# Patient Record
Sex: FEMALE | Race: WHITE
[De-identification: names, ages, dates, MRNs, and addresses within clinical notes are randomized per-mention and may not be internally consistent; named-entity substitution may affect disease eponyms.]

---

## 2017-03-13 NOTE — RAD
Indication: Abdominal pain.



Technique: Axial images and coronal and sagittal reformatted images are

provided. Patient received oral contrast and 75 mL of intravenous Omnipaque

300 without complication. No comparison is available.



One or more of the following individualized dose reduction techniques were

utilized for this examination:

1. Automated exposure control

2. Adjustment of the mA and/or kV according to patient size

3. Use of iterative reconstruction technique



Findings: The lung bases are clear. There is no pleural effusion. The heart is

not enlarged.



There is fatty infiltration of the liver. Gallbladder is surgically absent.

Spleen is not enlarged. The pancreas and the adrenals are unremarkable. The

kidneys are symmetrically perfused. Aorta is normal caliber. There is no small

bowel obstruction or mural thickening. Colon is grossly unremarkable. Normal

appendix is noted. Small fat-containing umbilical hernia is noted. Small

widemouthed fat containing right paramidline ventral hernia is noted in the

upper abdomen.



There is no free pelvic fluid. Bladder is unremarkable. There is no adnexal

mass. Bony structures are intact.



Impression:

1. No acute abdominal findings.

2. Fatty infiltration of the liver.

## 2017-06-02 NOTE — RAD
EXAM: DIGITAL SCREEN BILAT W/CAD



HISTORY: Screening.



COMPARISON: 3/26/2015 and 4/8/2016.



This study was interpreted with the benefit of Computerized Aided Detection

(CAD).





The breast parenchyma is primarily fatty replaced. Breast parenchyma level

density A.







FINDINGS:



No dominant suspicious mass, suspicious microcalcifications, or architectural

distortion is identified.  







IMPRESSION: 

No evidence of malignancy.







BI-RADS CATEGORY: 1 NEGATIVE



RECOMMENDED FOLLOW-UP: 12M 12 MONTH FOLLOW-UP



PQRS compliance statement: Patient information was entered into a reminder

system with a target due date     for the next mammogram.



Mammography is a sensitive method for finding small breast cancers, but it

does not detect them all and is not a substitute for careful clinical

examination.  A negative mammogram does not negate a clinically suspicious

finding and should not result in delay in biopsying a clinically suspicious

abnormality.



"Our facility is accredited by the [employer] Mammography

Program."

## 2017-10-05 ENCOUNTER — HOSPITAL ENCOUNTER (EMERGENCY)
Dept: HOSPITAL 61 - ER | Age: 42
Discharge: HOME | End: 2017-10-05
Payer: COMMERCIAL

## 2017-10-05 VITALS — BODY MASS INDEX: 48.29 KG/M2 | WEIGHT: 246 LBS | HEIGHT: 60 IN

## 2017-10-05 VITALS — SYSTOLIC BLOOD PRESSURE: 148 MMHG | DIASTOLIC BLOOD PRESSURE: 67 MMHG

## 2017-10-05 DIAGNOSIS — Z86.73: ICD-10-CM

## 2017-10-05 DIAGNOSIS — Z88.8: ICD-10-CM

## 2017-10-05 DIAGNOSIS — K21.9: ICD-10-CM

## 2017-10-05 DIAGNOSIS — Z88.0: ICD-10-CM

## 2017-10-05 DIAGNOSIS — R11.2: ICD-10-CM

## 2017-10-05 DIAGNOSIS — R51: Primary | ICD-10-CM

## 2017-10-05 LAB
BASOPHILS # BLD AUTO: 0 X10^3/UL (ref 0–0.2)
BASOPHILS NFR BLD: 1 % (ref 0–3)
CK SERPL-CCNC: 62 U/L (ref 26–192)
EOSINOPHIL NFR BLD: 2 % (ref 0–3)
ERYTHROCYTE [DISTWIDTH] IN BLOOD BY AUTOMATED COUNT: 13.7 % (ref 11.5–14.5)
HCT VFR BLD CALC: 46.3 % (ref 36–47)
HGB BLD-MCNC: 15.6 G/DL (ref 12–15.5)
LYMPHOCYTES # BLD: 1.6 X10^3/UL (ref 1–4.8)
LYMPHOCYTES NFR BLD AUTO: 18 % (ref 24–48)
MAGNESIUM SERPL-MCNC: 1.8 MG/DL (ref 1.8–2.4)
MCH RBC QN AUTO: 30 PG (ref 25–35)
MCHC RBC AUTO-ENTMCNC: 34 G/DL (ref 31–37)
MCV RBC AUTO: 90 FL (ref 79–100)
MONOCYTES NFR BLD: 6 % (ref 0–9)
NEUTROPHILS NFR BLD AUTO: 74 % (ref 31–73)
PLATELET # BLD AUTO: 211 X10^3/UL (ref 140–400)
RBC # BLD AUTO: 5.16 X10^6/UL (ref 3.5–5.4)
WBC # BLD AUTO: 9 X10^3/UL (ref 4–11)

## 2017-10-05 PROCEDURE — 36415 COLL VENOUS BLD VENIPUNCTURE: CPT

## 2017-10-05 PROCEDURE — 83690 ASSAY OF LIPASE: CPT

## 2017-10-05 PROCEDURE — 85025 COMPLETE CBC W/AUTO DIFF WBC: CPT

## 2017-10-05 PROCEDURE — 99284 EMERGENCY DEPT VISIT MOD MDM: CPT

## 2017-10-05 PROCEDURE — 96361 HYDRATE IV INFUSION ADD-ON: CPT

## 2017-10-05 PROCEDURE — 82550 ASSAY OF CK (CPK): CPT

## 2017-10-05 PROCEDURE — 81025 URINE PREGNANCY TEST: CPT

## 2017-10-05 PROCEDURE — 83735 ASSAY OF MAGNESIUM: CPT

## 2017-10-05 PROCEDURE — 96375 TX/PRO/DX INJ NEW DRUG ADDON: CPT

## 2017-10-05 PROCEDURE — 96374 THER/PROPH/DIAG INJ IV PUSH: CPT

## 2017-10-05 NOTE — PHYS DOC
Past Medical History


Past Medical History:  Anxiety, Bipolar, GERD, TIA


Past Surgical History:  Cholecystectomy, Tonsillectomy, Other


Additional Past Surgical Histo:  adenoidectomy


Alcohol Use:  Occasionally


Drug Use:  None





Adult General


Chief Complaint


Chief Complaint:  HEADACHE





HPI


HPI





Patient is a 41  year old female presenting to the emergency department for 

evaluation of a headache that has been going on since yesterday that she says 

is the same as prior migraine headaches.  He has nausea and vomiting with it 

and cannot hold down her medications including her migraine medications as she 

takes a triptan.  Patient says that she has been constipated since starting a 

new medication several days ago.  She denies any fevers chills diarrhea dysuria 

hematuria vision changes unilateral weakness numbness or tingling.





Review of Systems


Review of Systems





Constitutional: Denies fever or chills []


Eyes: Denies change in visual acuity, redness, or eye pain []


HENT: Denies nasal congestion or sore throat []


Respiratory: Denies cough or shortness of breath []


Cardiovascular: No additional information not addressed in HPI []


GI: Denies abdominal pain, nausea, vomiting, bloody stools or diarrhea []


: Denies dysuria or hematuria []


Musculoskeletal: Denies back pain or joint pain []


Integument: Denies rash or skin lesions []


Neurologic: + headache.  No focal weakness or sensory changes []





Current Medications


Current Medications





Current Medications








 Medications


  (Trade)  Dose


 Ordered  Sig/Milan  Start Time


 Stop Time Status Last Admin


Dose Admin


 


 Diphenhydramine


 HCl


  (Benadryl)  50 mg  1X  ONCE  10/5/17 15:15


 10/5/17 15:16 DC 10/5/17 15:24


50 MG


 


 Fentanyl Citrate


  (Fentanyl 2ml


 Vial)  50 mcg  1X  ONCE  10/5/17 15:15


 10/5/17 15:16 DC 10/5/17 15:23


50 MCG


 


 Ketorolac


 Tromethamine


  (Toradol)  30 mg  1X  ONCE  10/5/17 15:15


 10/5/17 15:16 DC 10/5/17 15:24


30 MG


 


 Metoclopramide HCl


  (Reglan)  10 mg  1X  ONCE  10/5/17 15:15


 10/5/17 15:16 DC 10/5/17 15:24


10 MG


 


 Sodium Chloride  1,000 ml @ 


 1,000 mls/hr  1X  ONCE  10/5/17 15:15


 10/5/17 16:14 DC 10/5/17 15:25


1,000 MLS/HR











Allergies


Allergies





Allergies








Coded Allergies Type Severity Reaction Last Updated Verified


 


  Penicillins Allergy Severe Anaphylaxis 11/13/15 Yes


 


  prochlorperazine Allergy Severe tongue swelling 5/11/14 Yes


 


  prochlorperazine edisylate Allergy Severe Anaphylaxis 11/13/14 Yes


 


  prochlorperazine maleate Allergy Severe Anaphylaxis 11/13/14 Yes











Physical Exam


Physical Exam





Constitutional: Well developed, well nourished, no acute distress, non-toxic 

appearance. []


HENT: Normocephalic, atraumatic, bilateral external ears normal, oropharynx 

moist, no oral exudates, nose normal. []


Eyes: PERRLA, EOMI, conjunctiva normal, no discharge. [] 


Neck: Normal range of motion, no tenderness, supple, no stridor. [] 


Cardiovascular:Heart rate regular rhythm, no murmur []


Lungs & Thorax:  Bilateral breath sounds clear to auscultation []


Abdomen: Bowel sounds normal, soft, no tenderness, no masses, no pulsatile 

masses. [] 


Skin: Warm, dry, no erythema, no rash. [] 


Back: No tenderness, no CVA tenderness. [] 


Extremities: No tenderness, no cyanosis, no clubbing, ROM intact, no edema. [] 


Neurologic: Alert and oriented X 3, normal motor function, normal sensory 

function, no focal deficits noted. []





Current Patient Data


Vital Signs





 Vital Signs








  Date Time  Temp Pulse Resp B/P (MAP) Pulse Ox O2 Delivery O2 Flow Rate FiO2


 


10/5/17 15:57     95 Room Air  


 


10/5/17 15:57  67 14     


 


10/5/17 14:54 98.0   150/74 (99)    





 98.0       








Lab Values





 Laboratory Tests








Test


  10/5/17


14:58 10/5/17


15:10


 


POC Urine HCG, Qualitative


  Hcg negative


(Negative) 


 


 


White Blood Count


  


  9.0 x10^3/uL


(4.0-11.0)


 


Red Blood Count


  


  5.16 x10^6/uL


(3.50-5.40)


 


Hemoglobin


  


  15.6 g/dL


(12.0-15.5)  H


 


Hematocrit


  


  46.3 %


(36.0-47.0)


 


Mean Corpuscular Volume


  


  90 fL ()


 


 


Mean Corpuscular Hemoglobin  30 pg (25-35)  


 


Mean Corpuscular Hemoglobin


Concent 


  34 g/dL


(31-37)


 


Red Cell Distribution Width


  


  13.7 %


(11.5-14.5)


 


Platelet Count


  


  211 x10^3/uL


(140-400)


 


Neutrophils (%) (Auto)  74 % (31-73)  H


 


Lymphocytes (%) (Auto)  18 % (24-48)  L


 


Monocytes (%) (Auto)  6 % (0-9)  


 


Eosinophils (%) (Auto)  2 % (0-3)  


 


Basophils (%) (Auto)  1 % (0-3)  


 


Neutrophils # (Auto)


  


  6.7 x10^3uL


(1.8-7.7)


 


Lymphocytes # (Auto)


  


  1.6 x10^3/uL


(1.0-4.8)


 


Monocytes # (Auto)


  


  0.5 x10^3/uL


(0.0-1.1)


 


Eosinophils # (Auto)


  


  0.2 x10^3/uL


(0.0-0.7)


 


Basophils # (Auto)


  


  0.0 x10^3/uL


(0.0-0.2)


 


Magnesium Level


  


  1.8 mg/dL


(1.8-2.4)


 


Creatine Kinase


  


  62 U/L


()


 


Lipase


  


  105 U/L


()





 Laboratory Tests


10/5/17 15:10











EKG


EKG


[]





Radiology/Procedures


Radiology/Procedures


[]





Course & Med Decision Making


Course & Med Decision Making


Patient with migraine headache that was treated with Toradol Benadryl and 

Reglan and now her headache and nausea is resolved but she is complaining 

mostly of constipation at this point and she sat down on the stool and was 

unable to go.  Patient has repeat normal neurologic exam and is stable for 

discharge so we will give her a glycerin suppository and magnesium citrate and 

have her drink at home have her follow with her primary care provider in the 

next 2-3 days and come back to the ED sooner with worsening pain fevers 

vomiting or other general concerns.  Patient aware and agreeable with plan for 

discharge and verbalized understanding of the above instructions.





Dragon Disclaimer


Dragon Disclaimer


This electronic medical record was generated, in whole or in part, using a 

voice recognition dictation system.





Departure


Departure


Impression:  


 Primary Impression:  


 Headache


 Additional Impression:  


 Constipation


Disposition:  01 HOME, SELF-CARE


Condition:  STABLE


Referrals:  


ELIEZER MG MD (PCP)


Patient Instructions:  Constipation, Adult


Scripts


Ondansetron (ZOFRAN ODT) 4 Mg Tab.rapdis


4 MG PO BID Y for NAUSEA/VOMITING, #10 TAB


   Prov: ELISSA NORTON DO         10/5/17





Problem Qualifiers








 Primary Impression:  


 Headache


 Headache type:  unspecified  Headache chronicity pattern:  unspecified pattern

  Intractability:  not intractable  Qualified Codes:  R51 - Headache








ELISSA NORTON DO Oct 5, 2017 14:57

## 2017-11-29 ENCOUNTER — HOSPITAL ENCOUNTER (EMERGENCY)
Dept: HOSPITAL 61 - ER | Age: 42
Discharge: HOME | End: 2017-11-29
Payer: COMMERCIAL

## 2017-11-29 VITALS
DIASTOLIC BLOOD PRESSURE: 64 MMHG | SYSTOLIC BLOOD PRESSURE: 128 MMHG | SYSTOLIC BLOOD PRESSURE: 128 MMHG | DIASTOLIC BLOOD PRESSURE: 64 MMHG

## 2017-11-29 VITALS — WEIGHT: 230 LBS | BODY MASS INDEX: 45.16 KG/M2 | HEIGHT: 60 IN

## 2017-11-29 DIAGNOSIS — G43.909: Primary | ICD-10-CM

## 2017-11-29 DIAGNOSIS — I10: ICD-10-CM

## 2017-11-29 DIAGNOSIS — K21.9: ICD-10-CM

## 2017-11-29 DIAGNOSIS — Z88.8: ICD-10-CM

## 2017-11-29 DIAGNOSIS — F31.9: ICD-10-CM

## 2017-11-29 DIAGNOSIS — Z86.73: ICD-10-CM

## 2017-11-29 DIAGNOSIS — F41.9: ICD-10-CM

## 2017-11-29 DIAGNOSIS — Z90.49: ICD-10-CM

## 2017-11-29 DIAGNOSIS — Z88.0: ICD-10-CM

## 2017-11-29 PROCEDURE — 81025 URINE PREGNANCY TEST: CPT

## 2017-11-29 PROCEDURE — 99284 EMERGENCY DEPT VISIT MOD MDM: CPT

## 2017-11-29 PROCEDURE — 96374 THER/PROPH/DIAG INJ IV PUSH: CPT

## 2017-11-29 PROCEDURE — 96361 HYDRATE IV INFUSION ADD-ON: CPT

## 2017-11-29 PROCEDURE — 96375 TX/PRO/DX INJ NEW DRUG ADDON: CPT

## 2017-11-29 NOTE — PHYS DOC
Past Medical History


Past Medical History:  Anxiety, Bipolar, GERD, TIA


Past Surgical History:  Cholecystectomy, Tonsillectomy, Other


Additional Past Surgical Histo:  adenoidectomy


Alcohol Use:  Occasionally


Drug Use:  None





Adult General


Chief Complaint


Chief Complaint:  HEADACHE





HPI


HPI





Patient is a 42  year old  with history of bipolar, hypertension, PCO S, CVA 

and recurrent migraines who presents with typical migraine headache. Headache 

is located in his usual location. The room the right temporal lobe. Is 

associated with light and sound sensitivity. Headache is rated moderate to 

severe started approximately 2 days ago. Patient took nausea medication 

sumatriptan with limited relief. Denies neck pain, stiffness, fever or rash, 

extremity weakness. Sensation. Also reports loose stools for the past 3 days. 

No other symptoms or complaints.[]





Review of Systems


Review of Systems


Review symptoms as per history of present illness. All other review symptoms 

are negative. []





All other systems were reviewed and found to be within normal limits, except as 

documented in this note.





Current Medications


Current Medications





Current Medications








 Medications


  (Trade)  Dose


 Ordered  Sig/Milan  Start Time


 Stop Time Status Last Admin


Dose Admin


 


 Dexamethasone


 Sodium Phosphate


  (Decadron)  10 mg  1X  ONCE  11/29/17 05:15


 11/29/17 05:16 DC 11/29/17 05:03


10 MG


 


 Diphenhydramine


 HCl


  (Benadryl)  50 mg  1X  ONCE  11/29/17 04:15


 11/29/17 04:16 DC 11/29/17 04:11


50 MG


 


 Fentanyl Citrate


  (Fentanyl 2ml


 Vial)  75 mcg  1X  ONCE  11/29/17 04:15


 11/29/17 04:16 DC 11/29/17 04:10


75 MCG


 


 Ketorolac


 Tromethamine


  (Toradol)  30 mg  1X  ONCE  11/29/17 04:30


 11/29/17 04:31 DC 11/29/17 04:18


30 MG


 


 Metoclopramide HCl


  (Reglan Vial)  10 mg  1X  ONCE  11/29/17 04:15


 11/29/17 04:16 DC 11/29/17 04:08


10 MG


 


 Sodium Chloride  1,000 ml @ 


 1,000 mls/hr  1X  ONCE  11/29/17 04:15


 11/29/17 05:14 DC 11/29/17 04:12


1,000 MLS/HR











Allergies


Allergies





Allergies








Coded Allergies Type Severity Reaction Last Updated Verified


 


  Penicillins Allergy Severe Anaphylaxis 11/13/15 Yes


 


  prochlorperazine Allergy Severe tongue swelling 5/11/14 Yes


 


  prochlorperazine edisylate Allergy Severe Anaphylaxis 11/13/14 Yes


 


  prochlorperazine maleate Allergy Severe Anaphylaxis 11/13/14 Yes











Physical Exam


Physical Exam





Constitutional: Well developed, well nourished, moderate discomfort secondary 

to pain.. []


HENT: Normocephalic, atraumatic, bilateral external ears normal, oropharynx 

moist, no oral exudates, nose normal. []


Eyes: PERRLA, EOMI, light sensitive.[] 


Neck: Normal range of motion, no tenderness, supple, no stridor. [] 


Cardiovascular:Heart rate regular rhythm, no murmur []


Lungs & Thorax:  Bilateral breath sounds clear to auscultation. [] 


Extremities: No tenderness, no cyanosis, no clubbing, ROM intact, no edema. [] 


Neurologic: Alert and oriented X 3, cranial nerves II through XII grossly intact

, normal motor function, normal sensory function, no focal deficits noted. []


Psychologic: Affect normal, judgement normal, mood normal. []





Current Patient Data


Vital Signs





 Vital Signs








  Date Time  Temp Pulse Resp B/P (MAP) Pulse Ox O2 Delivery O2 Flow Rate FiO2


 


11/29/17 04:10   16  96 Room Air  


 


11/29/17 03:12 97.4 72  176/101 (126)    





 97.4       








Lab Values





 Laboratory Tests








Test


  11/29/17


03:43


 


POC Urine HCG, Qualitative


  Hcg negative


(Negative)











EKG


EKG


[]





Radiology/Procedures


Radiology/Procedures


[]





Course & Med Decision Making


Course & Med Decision Making


Pertinent Labs and Imaging studies reviewed. (See chart for details)





[Acute recurrent migraine headache. No neurologic deficits on exam. Symptoms 

significantly improved with treatment. Will defer further evaluation to patient'

s physician. Return precautions reviewed..]





Dragon Disclaimer


Dragon Disclaimer


This electronic medical record was generated, in whole or in part, using a 

voice recognition dictation system.





Departure


Departure


Impression:  


 Primary Impression:  


 Migraine headache


Disposition:  01 HOME, SELF-CARE


Condition:  GOOD


Patient Instructions:  Migraine Headache, Easy-to-Read





Additional Instructions:  


Please go home and rest. Drink clear liquids and take medications as prescribed 

if migraine persists. Follow up with your PCP or neurologist for re-evaluation.











THEODORE ROQUE DO Nov 29, 2017 05:26

## 2019-01-02 ENCOUNTER — HOSPITAL ENCOUNTER (OUTPATIENT)
Dept: HOSPITAL 61 - KCIC US | Age: 44
Discharge: HOME | End: 2019-01-02
Attending: FAMILY MEDICINE
Payer: COMMERCIAL

## 2019-01-02 DIAGNOSIS — R13.10: Primary | ICD-10-CM

## 2019-01-02 PROCEDURE — 76536 US EXAM OF HEAD AND NECK: CPT

## 2019-01-02 NOTE — [facility]
EXAM: Thyroid sonogram.

 

HISTORY: Dysphagia.

 

TECHNIQUE: Sonographic imaging of the thyroid was performed.

 

COMPARISON: None.

 

FINDINGS: The right thyroid lobe measures 5.7 x 1.9 x 2.2 cm. The left 

thyroid lobe measures 5.5 x 1.5 x 1.9 cm. The thyroid isthmus measures 4.6

mm. There is a suspected complicated cyst or mixed solid and cystic lesion

within the inferior right thyroid lobe measuring 6 x 4 x 4 mm. There is a 

similar suspected complicated cyst or mixed solid and cystic lesion within

the inferior left thyroid lobe measuring 4 x 3 x 2 mm. The thyroid 

parenchyma is diffusely heterogeneous.

 

IMPRESSION:

1. Tiny benign-appearing hypoechoic lesions within both thyroid lobes 

measuring 6 mm on the right and 4 mm of the left.

2. Mildly heterogeneous thyroid parenchyma. This can be seen as a sequela 

of thyroiditis. The thyroid is prominent in size

 

Electronically signed by: Emperatriz Hutchinson MD (1/2/2019 4:10 PM) [id number]

## 2019-05-28 VITALS — DIASTOLIC BLOOD PRESSURE: 85 MMHG | SYSTOLIC BLOOD PRESSURE: 134 MMHG

## 2020-09-09 ENCOUNTER — HOSPITAL ENCOUNTER (OUTPATIENT)
Dept: HOSPITAL 61 - US | Age: 45
Discharge: HOME | End: 2020-09-09
Attending: NURSE PRACTITIONER
Payer: COMMERCIAL

## 2020-09-09 DIAGNOSIS — R10.2: ICD-10-CM

## 2020-09-09 DIAGNOSIS — R93.89: Primary | ICD-10-CM

## 2020-09-09 PROCEDURE — 76856 US EXAM PELVIC COMPLETE: CPT

## 2020-09-09 PROCEDURE — 76830 TRANSVAGINAL US NON-OB: CPT

## 2020-09-09 NOTE — RAD
EXAM: Pelvic sonogram.

 

HISTORY: Pain.

 

TECHNIQUE: Transabdominal and transvaginal sonographic imaging of the 

pelvis was performed.

 

COMPARISON: None.

 

FINDINGS: The exam is limited due to bowel gas and body habitus. The 

uterus is normal in size. The endometrial stripe measures 9 mm thickness. 

The ovaries are normal in size and demonstrate normal blood flow. No 

pelvic free fluid is seen.

 

IMPRESSION:

1. Thickened endometrial stripe for the reported postmenopausal status of 

patient, measuring 9 mm.

2. Unremarkable ovaries.

3. Limited exam due to bowel gas and body habitus.

 

Electronically signed by: Emperatriz Hutchinson MD (9/9/2020 2:44 PM) [facility]